# Patient Record
Sex: FEMALE | Race: WHITE | NOT HISPANIC OR LATINO | URBAN - NONMETROPOLITAN AREA
[De-identification: names, ages, dates, MRNs, and addresses within clinical notes are randomized per-mention and may not be internally consistent; named-entity substitution may affect disease eponyms.]

---

## 2019-06-18 ENCOUNTER — APPOINTMENT (RX ONLY)
Dept: URBAN - NONMETROPOLITAN AREA CLINIC 3 | Facility: CLINIC | Age: 74
Setting detail: DERMATOLOGY
End: 2019-06-18

## 2019-06-18 DIAGNOSIS — I87.2 VENOUS INSUFFICIENCY (CHRONIC) (PERIPHERAL): ICD-10-CM

## 2019-06-18 DIAGNOSIS — S0182XA OPEN WOUND OF OTHER AND MULTIPLE SITES OF FACE, COMPLICATED: ICD-10-CM

## 2019-06-18 PROBLEM — E13.9 OTHER SPECIFIED DIABETES MELLITUS WITHOUT COMPLICATIONS: Status: ACTIVE | Noted: 2019-06-18

## 2019-06-18 PROBLEM — S81.802A UNSPECIFIED OPEN WOUND, LEFT LOWER LEG, INITIAL ENCOUNTER: Status: ACTIVE | Noted: 2019-06-18

## 2019-06-18 PROBLEM — I10 ESSENTIAL (PRIMARY) HYPERTENSION: Status: ACTIVE | Noted: 2019-06-18

## 2019-06-18 PROBLEM — H91.90 UNSPECIFIED HEARING LOSS, UNSPECIFIED EAR: Status: ACTIVE | Noted: 2019-06-18

## 2019-06-18 PROBLEM — M12.9 ARTHROPATHY, UNSPECIFIED: Status: ACTIVE | Noted: 2019-06-18

## 2019-06-18 PROBLEM — E78.5 HYPERLIPIDEMIA, UNSPECIFIED: Status: ACTIVE | Noted: 2019-06-18

## 2019-06-18 PROCEDURE — 99201: CPT

## 2019-06-18 PROCEDURE — ? COUNSELING

## 2019-06-18 PROCEDURE — ? ADDITIONAL NOTES

## 2019-06-18 ASSESSMENT — LOCATION ZONE DERM: LOCATION ZONE: LEG

## 2019-06-18 ASSESSMENT — LOCATION SIMPLE DESCRIPTION DERM: LOCATION SIMPLE: LEFT PRETIBIAL REGION

## 2019-06-18 ASSESSMENT — LOCATION DETAILED DESCRIPTION DERM: LOCATION DETAILED: LEFT DISTAL PRETIBIAL REGION

## 2019-06-18 NOTE — PROCEDURE: ADDITIONAL NOTES
Additional Notes: Non healing wound. Referred to us from Dr. Malone for what he thinks is an eroded skin graft. We don’t have the necessary supplies to rewrap the wound after we unwrap it. Patient will attempt to return to clinic tomorrow prior to her home health nurse coming over to re bandage the wound. I suspect venous insufficiency and venous stasis. Along with a history of significant CHF being to Blame for her poor wound healing. Patient is aware she needs to live in her compression stockings on a consistent basis and keep the leg elevated above heart at all times when at rest. Recommend she continue aggressive wound care.
Detail Level: Simple

## 2019-06-18 NOTE — HPI: OTHER
Condition:: Eroded skin graft
Please Describe Your Condition:: Eroded skin graft. Referral from Dr. Malone

## 2019-07-01 ENCOUNTER — APPOINTMENT (RX ONLY)
Dept: URBAN - NONMETROPOLITAN AREA CLINIC 3 | Facility: CLINIC | Age: 74
Setting detail: DERMATOLOGY
End: 2019-07-01

## 2019-07-01 DIAGNOSIS — L88 PYODERMA GANGRENOSUM: ICD-10-CM

## 2019-07-01 PROCEDURE — ? ADDITIONAL NOTES

## 2019-07-01 PROCEDURE — ? BIOPSY BY PUNCH METHOD

## 2019-07-01 PROCEDURE — ? ORDER TESTS

## 2019-07-01 PROCEDURE — 11104 PUNCH BX SKIN SINGLE LESION: CPT

## 2019-07-01 PROCEDURE — 99213 OFFICE O/P EST LOW 20 MIN: CPT | Mod: 25

## 2019-07-01 ASSESSMENT — LOCATION ZONE DERM: LOCATION ZONE: LEG

## 2019-07-01 ASSESSMENT — LOCATION SIMPLE DESCRIPTION DERM: LOCATION SIMPLE: LEFT PRETIBIAL REGION

## 2019-07-01 ASSESSMENT — LOCATION DETAILED DESCRIPTION DERM: LOCATION DETAILED: LEFT PROXIMAL PRETIBIAL REGION

## 2019-07-01 NOTE — HPI: FOLLOW UP OTHER
What Is Your Reason For Requesting A Follow-Up Appointment?: Patient presents for follow up evaluation of a non-healing graft site on her left lower leg.  She noticed an ulcer 5-6 year ago that worsened over the years, was diagnosed as a Venous Ulcer.  She was seen at [facility] in August of 2018 at this time a skin graft was performed, but did not take.  She had a second skin graft in March of 2019, she thinks that some of the graft is taking, but other areas are not.  She has a Home Health Nurse that comes to her house 3 times a week to perform dressing changes.  She is currently on Linezolid 600mg BID and Cefdinir 300mg BID for the treatment of infection.

## 2019-07-01 NOTE — PROCEDURE: BIOPSY BY PUNCH METHOD
Home Suture Removal Text: Gut sutures will desolve on their own. Nylon sutures are to be removed after 10-14 days for trunk and extremities, 7 days for face.
Additional Anesthesia Volume In Cc (Will Not Render If 0): 0
Lab Facility: [id number]
Anesthesia Type: 1% lidocaine with epinephrine
Path Notes (To The Dermatopathologist): Please Perform a Marshall Stain
Bill 07443 For Specimen Handling/Conveyance To Laboratory?: no
Detail Level: Detailed
Anesthesia Volume In Cc: 0.5
Wound Care: Petrolatum
Consent: Verbal consent was obtained and risks were reviewed including but not limited to scarring, infection, bleeding, scabbing, incomplete removal, nerve damage and allergy to anesthesia.
Notification Instructions: Patient will be notified of biopsy results. However, patient instructed to call the office if not contacted within 2 weeks.
Punch Size In Mm: 4
Billing Type: Third-Party Bill
Epidermal Sutures: none, closed by secondary intention
Lab: [id number]
Was A Bandage Applied: Yes
Hemostasis: None
Biopsy Type: H and E
Post-Care Instructions: I reviewed with the patient in detail post-care instructions. Patient is to keep the biopsy site dry overnight, after 24 hours wash with soap and water daily, and then apply vaseline and bandage daily until healed.
Dressing: bandage

## 2019-07-25 ENCOUNTER — APPOINTMENT (RX ONLY)
Dept: URBAN - NONMETROPOLITAN AREA CLINIC 3 | Facility: CLINIC | Age: 74
Setting detail: DERMATOLOGY
End: 2019-07-25

## 2019-07-25 DIAGNOSIS — S0182XA OPEN WOUND OF OTHER AND MULTIPLE SITES OF FACE, COMPLICATED: ICD-10-CM

## 2019-07-25 PROBLEM — S81.802A UNSPECIFIED OPEN WOUND, LEFT LOWER LEG, INITIAL ENCOUNTER: Status: ACTIVE | Noted: 2019-07-25

## 2019-07-25 PROCEDURE — 99213 OFFICE O/P EST LOW 20 MIN: CPT

## 2019-07-25 PROCEDURE — ? ADDITIONAL NOTES

## 2019-07-25 ASSESSMENT — LOCATION ZONE DERM: LOCATION ZONE: LEG

## 2019-07-25 ASSESSMENT — LOCATION DETAILED DESCRIPTION DERM: LOCATION DETAILED: LEFT DISTAL PRETIBIAL REGION

## 2019-07-25 ASSESSMENT — LOCATION SIMPLE DESCRIPTION DERM: LOCATION SIMPLE: LEFT PRETIBIAL REGION

## 2019-07-25 NOTE — PROCEDURE: ADDITIONAL NOTES
Detail Level: Simple
Additional Notes: Patient’s bandage was removed today, TCA applied to hypergranulation areas of left tibial skin graft as recommended by Dr. Ramos. Dressing reapplied in same fashion as home health ordered. Patient is scheduled to see Dr. Gusman on August 5th, 2019 for follow up.

## 2019-08-05 ENCOUNTER — APPOINTMENT (RX ONLY)
Dept: URBAN - NONMETROPOLITAN AREA CLINIC 3 | Facility: CLINIC | Age: 74
Setting detail: DERMATOLOGY
End: 2019-08-05

## 2019-08-05 DIAGNOSIS — R22.9 LOCALIZED SWELLING, MASS AND LUMP, UNSPECIFIED: ICD-10-CM

## 2019-08-05 DIAGNOSIS — S0182XA OPEN WOUND OF OTHER AND MULTIPLE SITES OF FACE, COMPLICATED: ICD-10-CM | Status: IMPROVED

## 2019-08-05 PROBLEM — S81.802A UNSPECIFIED OPEN WOUND, LEFT LOWER LEG, INITIAL ENCOUNTER: Status: ACTIVE | Noted: 2019-08-05

## 2019-08-05 PROCEDURE — ? ORDER TESTS

## 2019-08-05 PROCEDURE — ? TREATMENT REGIMEN

## 2019-08-05 PROCEDURE — 99213 OFFICE O/P EST LOW 20 MIN: CPT | Mod: 25

## 2019-08-05 PROCEDURE — ? SEPARATE AND IDENTIFIABLE DOCUMENTATION

## 2019-08-05 PROCEDURE — ? UNNA BOOT APPLICATION

## 2019-08-05 PROCEDURE — 29580 STRAPPING UNNA BOOT: CPT | Mod: LT

## 2019-08-05 PROCEDURE — ? COUNSELING

## 2019-08-05 ASSESSMENT — LOCATION ZONE DERM: LOCATION ZONE: LEG

## 2019-08-05 ASSESSMENT — LOCATION DETAILED DESCRIPTION DERM
LOCATION DETAILED: LEFT DISTAL PRETIBIAL REGION
LOCATION DETAILED: LEFT PROXIMAL PRETIBIAL REGION

## 2019-08-05 ASSESSMENT — LOCATION SIMPLE DESCRIPTION DERM: LOCATION SIMPLE: LEFT PRETIBIAL REGION

## 2019-08-05 NOTE — PROCEDURE: TREATMENT REGIMEN
Detail Level: Zone
Plan: Culture taken today. Will call if she needs antibiotics. RTC 6 weeks.
Continue Regimen: At home wound care

## 2019-08-05 NOTE — PROCEDURE: UNNA BOOT APPLICATION
Removal Of Previous Unna Boot (No) Text: The site was cleaned in preparation for an Unna Boot application.
Location Applied: the left leg
Other Medication Occluded Under Unnaboot: xeroform, pads and kerlix and coban wrap that the patient brought with her.
Removal Of Previous Unna Boot (Yes) Text: The previous Unna Boot and then the site was cleaned in preparation for another Unna Boot application.
After Unna Boot Application Text: Coban was used to wrap the outside of Unna Boot and we ensured the Unna Boot wasn't too tight prior to the patient leaving the office.
Additional Instructions: Continue with at home health wound care.
Indication: graft dressing
Detail Level: Detailed
Was A Previous Unna Boot Removed?: Yes

## 2019-08-07 ENCOUNTER — RX ONLY (OUTPATIENT)
Age: 74
Setting detail: RX ONLY
End: 2019-08-07

## 2019-08-07 RX ORDER — CEPHALEXIN 250 MG/1
1 CAPSULE ORAL TID
Qty: 42 | Refills: 0 | COMMUNITY
Start: 2019-08-07

## 2019-08-07 RX ORDER — DOXYCYCLINE 100 MG/1
1 CAPSULE ORAL BID
Qty: 28 | Refills: 0 | Status: ERX | COMMUNITY
Start: 2019-08-07

## 2019-08-09 ENCOUNTER — RX ONLY (OUTPATIENT)
Age: 74
Setting detail: RX ONLY
End: 2019-08-09

## 2019-08-09 RX ORDER — AMOXICILLIN AND CLAVULANATE POTASSIUM 500; 125 MG/1; 1/1
TABLET, FILM COATED ORAL
Qty: 20 | Refills: 0 | Status: ERX | COMMUNITY
Start: 2019-08-09

## 2019-09-16 ENCOUNTER — APPOINTMENT (RX ONLY)
Dept: URBAN - NONMETROPOLITAN AREA CLINIC 3 | Facility: CLINIC | Age: 74
Setting detail: DERMATOLOGY
End: 2019-09-16

## 2019-09-16 DIAGNOSIS — S0182XA OPEN WOUND OF OTHER AND MULTIPLE SITES OF FACE, COMPLICATED: ICD-10-CM

## 2019-09-16 PROBLEM — T07.XXXA UNSPECIFIED MULTIPLE INJURIES, INITIAL ENCOUNTER: Status: ACTIVE | Noted: 2019-09-16

## 2019-09-16 PROCEDURE — 99214 OFFICE O/P EST MOD 30 MIN: CPT

## 2019-09-16 PROCEDURE — ? ADDITIONAL NOTES

## 2019-09-16 NOTE — PROCEDURE: ADDITIONAL NOTES
Detail Level: Simple
Additional Notes: Dressing on left leg was unwrapped. Wound was inspected. Then re-wrapped with supplies brought by patient from her wound care nurse. \\nPt is afebrile and there is no sign of infection. Her wound has improved compared to 8/5/19. There are some staples are still in place; it is Dr. Malone, Dr. Penn and Dr. Gusman’s opinion that the staples should remain so as not to trigger another acute flare of inflammation/rejection of the graft. Will leave them until skin heals more. Have been treating the granulation within the wound with TCA, but the granulation has decreased enough that we will not do a TCA treatment today. She is receiving at home wound care 3 x a week from the wound clinic. Fresh dressing put on wound. Would like to see her back the first week in Oct. for another MD f/u.

## 2019-10-08 ENCOUNTER — APPOINTMENT (RX ONLY)
Dept: URBAN - NONMETROPOLITAN AREA CLINIC 3 | Facility: CLINIC | Age: 74
Setting detail: DERMATOLOGY
End: 2019-10-08

## 2019-10-08 DIAGNOSIS — S0182XA OPEN WOUND OF OTHER AND MULTIPLE SITES OF FACE, COMPLICATED: ICD-10-CM

## 2019-10-08 PROBLEM — T07.XXXA UNSPECIFIED MULTIPLE INJURIES, INITIAL ENCOUNTER: Status: ACTIVE | Noted: 2019-10-08

## 2019-10-08 PROCEDURE — 99213 OFFICE O/P EST LOW 20 MIN: CPT

## 2019-10-08 PROCEDURE — ? ADDITIONAL NOTES

## 2019-10-08 NOTE — PROCEDURE: ADDITIONAL NOTES
Detail Level: Simple
Additional Notes: Patient’s dressing were removed and upon exam her leg seems to be healing. The top 7 cm of skin I believe to be almost completely resolved and wound care nurses should consider no longer dressing the area. Patient brought all of her wound care supplies, her wound was redressed. Advised her to contact her wound care nurses to have wound redressed by them. Will have her follow up with Dr. Ramos next month when she is in clinic.
